# Patient Record
Sex: MALE | Race: WHITE | NOT HISPANIC OR LATINO | URBAN - METROPOLITAN AREA
[De-identification: names, ages, dates, MRNs, and addresses within clinical notes are randomized per-mention and may not be internally consistent; named-entity substitution may affect disease eponyms.]

---

## 2024-04-07 ENCOUNTER — TRANSFERRED RECORDS (OUTPATIENT)
Dept: HEALTH INFORMATION MANAGEMENT | Facility: CLINIC | Age: 24
End: 2024-04-07

## 2024-04-08 ENCOUNTER — TRANSFERRED RECORDS (OUTPATIENT)
Dept: HEALTH INFORMATION MANAGEMENT | Facility: CLINIC | Age: 24
End: 2024-04-08

## 2024-04-08 ENCOUNTER — TELEPHONE (OUTPATIENT)
Dept: BEHAVIORAL HEALTH | Facility: HOSPITAL | Age: 24
End: 2024-04-08

## 2024-04-08 ENCOUNTER — HOSPITAL ENCOUNTER (INPATIENT)
Facility: HOSPITAL | Age: 24
LOS: 3 days | Discharge: HOME | DRG: 885 | End: 2024-04-11
Attending: STUDENT IN AN ORGANIZED HEALTH CARE EDUCATION/TRAINING PROGRAM | Admitting: STUDENT IN AN ORGANIZED HEALTH CARE EDUCATION/TRAINING PROGRAM
Payer: COMMERCIAL

## 2024-04-08 ENCOUNTER — TELEPHONE (OUTPATIENT)
Dept: BEHAVIORAL HEALTH | Facility: CLINIC | Age: 24
End: 2024-04-08

## 2024-04-08 DIAGNOSIS — F33.2 SEVERE RECURRENT MAJOR DEPRESSION WITHOUT PSYCHOTIC FEATURES (H): Primary | ICD-10-CM

## 2024-04-08 PROBLEM — T14.91XA SUICIDE ATTEMPT (H): Status: ACTIVE | Noted: 2024-04-08

## 2024-04-08 PROCEDURE — 124N000001 HC R&B MH

## 2024-04-08 RX ORDER — LEVOTHYROXINE SODIUM 75 UG/1
1 TABLET ORAL DAILY
Status: ON HOLD | COMMUNITY
Start: 2023-06-21 | End: 2024-04-10

## 2024-04-08 RX ORDER — FAMOTIDINE 20 MG/1
20 TABLET, FILM COATED ORAL AT BEDTIME
COMMUNITY

## 2024-04-08 RX ORDER — ACETAMINOPHEN 325 MG/1
650 TABLET ORAL EVERY 4 HOURS PRN
Status: DISCONTINUED | OUTPATIENT
Start: 2024-04-08 | End: 2024-04-11 | Stop reason: HOSPADM

## 2024-04-08 RX ORDER — ARIPIPRAZOLE 10 MG/1
10 TABLET ORAL DAILY
Status: ON HOLD | COMMUNITY
Start: 2024-04-03 | End: 2024-04-10

## 2024-04-08 RX ORDER — MAGNESIUM HYDROXIDE/ALUMINUM HYDROXICE/SIMETHICONE 120; 1200; 1200 MG/30ML; MG/30ML; MG/30ML
30 SUSPENSION ORAL EVERY 4 HOURS PRN
Status: DISCONTINUED | OUTPATIENT
Start: 2024-04-08 | End: 2024-04-11 | Stop reason: HOSPADM

## 2024-04-08 RX ORDER — METFORMIN HCL 500 MG
1000 TABLET, EXTENDED RELEASE 24 HR ORAL
Status: ON HOLD | COMMUNITY
Start: 2024-01-15 | End: 2024-04-10

## 2024-04-08 RX ORDER — OLANZAPINE 10 MG/1
10 TABLET ORAL 3 TIMES DAILY PRN
Status: DISCONTINUED | OUTPATIENT
Start: 2024-04-08 | End: 2024-04-11 | Stop reason: HOSPADM

## 2024-04-08 RX ORDER — OLANZAPINE 10 MG/2ML
10 INJECTION, POWDER, FOR SOLUTION INTRAMUSCULAR 3 TIMES DAILY PRN
Status: DISCONTINUED | OUTPATIENT
Start: 2024-04-08 | End: 2024-04-11 | Stop reason: HOSPADM

## 2024-04-08 RX ORDER — ESCITALOPRAM OXALATE 20 MG/1
20 TABLET ORAL DAILY
Status: ON HOLD | COMMUNITY
Start: 2024-03-20 | End: 2024-04-10

## 2024-04-08 RX ORDER — LANOLIN ALCOHOL/MO/W.PET/CERES
3 CREAM (GRAM) TOPICAL
Status: DISCONTINUED | OUTPATIENT
Start: 2024-04-08 | End: 2024-04-11 | Stop reason: HOSPADM

## 2024-04-08 RX ORDER — BUPROPION HYDROCHLORIDE 150 MG/1
150 TABLET ORAL EVERY MORNING
Status: ON HOLD | COMMUNITY
Start: 2024-03-05 | End: 2024-04-10

## 2024-04-08 RX ORDER — HYDROXYZINE HYDROCHLORIDE 25 MG/1
25 TABLET, FILM COATED ORAL EVERY 4 HOURS PRN
Status: DISCONTINUED | OUTPATIENT
Start: 2024-04-08 | End: 2024-04-11 | Stop reason: HOSPADM

## 2024-04-08 ASSESSMENT — ACTIVITIES OF DAILY LIVING (ADL)
LAUNDRY: UNABLE TO COMPLETE
ORAL_HYGIENE: INDEPENDENT
DRESS: INDEPENDENT;SCRUBS (BEHAVIORAL HEALTH)
ADLS_ACUITY_SCORE: 30
ADLS_ACUITY_SCORE: 45
ADLS_ACUITY_SCORE: 30
HYGIENE/GROOMING: INDEPENDENT

## 2024-04-08 NOTE — TELEPHONE ENCOUNTER
S:  Daniele BOSCH from  [facility] called to say they were directly admitting this pt to 5 N/S.    Mario completed 2:49 PM

## 2024-04-08 NOTE — PROGRESS NOTES
04/08/24 1851   Patient Belongings   Did you bring any home meds/supplements to the hospital?  No   Patient Belongings remains with patient   Patient Belongings Remaining with Patient vision aids  (glasses)   Patient Belongings Put in Hospital Secure Location (Security or Locker, etc.) shoes;clothing;other (see comments)  (wrangler pants, underwear, belt, tennis shoes, tshirt, leather jacket)   Belongings Search Yes   Clothing Search Yes   Second Staff Barbara   Comment .     List items sent to safe: black samsung phone in black case  All other belongings put in assigned cubby in belongings room.       I have reviewed my belongings list on admission and verify that it is correct.     Patient signature_______________________________    Second staff witness (if patient unable to sign) ______________________________       I have received all my belongings at discharge.    Patient signature________________________________    Gregoria  4/8/2024  6:52 PM
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
## 2024-04-09 PROCEDURE — 99222 1ST HOSP IP/OBS MODERATE 55: CPT | Performed by: NURSE PRACTITIONER

## 2024-04-09 PROCEDURE — 250N000013 HC RX MED GY IP 250 OP 250 PS 637: Performed by: PSYCHIATRY & NEUROLOGY

## 2024-04-09 PROCEDURE — 99223 1ST HOSP IP/OBS HIGH 75: CPT | Mod: AI | Performed by: PSYCHIATRY & NEUROLOGY

## 2024-04-09 PROCEDURE — 124N000001 HC R&B MH

## 2024-04-09 RX ORDER — ESCITALOPRAM OXALATE 10 MG/1
20 TABLET ORAL DAILY
Status: DISCONTINUED | OUTPATIENT
Start: 2024-04-09 | End: 2024-04-11 | Stop reason: HOSPADM

## 2024-04-09 RX ORDER — METFORMIN HCL 500 MG
1000 TABLET, EXTENDED RELEASE 24 HR ORAL DAILY
Status: DISCONTINUED | OUTPATIENT
Start: 2024-04-09 | End: 2024-04-11 | Stop reason: HOSPADM

## 2024-04-09 RX ORDER — ARIPIPRAZOLE 10 MG/1
10 TABLET ORAL DAILY
Status: DISCONTINUED | OUTPATIENT
Start: 2024-04-09 | End: 2024-04-11 | Stop reason: HOSPADM

## 2024-04-09 RX ORDER — METHYLPREDNISOLONE 4 MG
TABLET, DOSE PACK ORAL
Status: ON HOLD | COMMUNITY
Start: 2024-04-02 | End: 2024-04-09

## 2024-04-09 RX ORDER — BUPROPION HYDROCHLORIDE 150 MG/1
150 TABLET ORAL EVERY MORNING
Status: DISCONTINUED | OUTPATIENT
Start: 2024-04-09 | End: 2024-04-11 | Stop reason: HOSPADM

## 2024-04-09 RX ORDER — FAMOTIDINE 20 MG/1
20 TABLET, FILM COATED ORAL AT BEDTIME
Status: DISCONTINUED | OUTPATIENT
Start: 2024-04-09 | End: 2024-04-11 | Stop reason: HOSPADM

## 2024-04-09 RX ADMIN — LEVOTHYROXINE SODIUM 75 MCG: 0.03 TABLET ORAL at 16:11

## 2024-04-09 RX ADMIN — FAMOTIDINE 20 MG: 20 TABLET, FILM COATED ORAL at 20:14

## 2024-04-09 RX ADMIN — BUPROPION HYDROCHLORIDE 150 MG: 150 TABLET, FILM COATED, EXTENDED RELEASE ORAL at 16:11

## 2024-04-09 RX ADMIN — ARIPIPRAZOLE 10 MG: 10 TABLET ORAL at 16:11

## 2024-04-09 RX ADMIN — ESCITALOPRAM OXALATE 20 MG: 10 TABLET ORAL at 16:11

## 2024-04-09 RX ADMIN — METFORMIN HYDROCHLORIDE 1000 MG: 500 TABLET, EXTENDED RELEASE ORAL at 16:11

## 2024-04-09 ASSESSMENT — ACTIVITIES OF DAILY LIVING (ADL)
ADLS_ACUITY_SCORE: 30
DRESS: SCRUBS (BEHAVIORAL HEALTH)
ADLS_ACUITY_SCORE: 30
ORAL_HYGIENE: INDEPENDENT
ADLS_ACUITY_SCORE: 30
DRESS: SCRUBS (BEHAVIORAL HEALTH);INDEPENDENT
ADLS_ACUITY_SCORE: 30
ADLS_ACUITY_SCORE: 30
HYGIENE/GROOMING: INDEPENDENT
ADLS_ACUITY_SCORE: 30
ORAL_HYGIENE: INDEPENDENT
ADLS_ACUITY_SCORE: 30
HYGIENE/GROOMING: INDEPENDENT
ADLS_ACUITY_SCORE: 30
ADLS_ACUITY_SCORE: 30
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
## 2024-04-09 NOTE — H&P
[facility]    History and Physical  Medical Services       Date of Admission:  4/8/2024  Date of Service (when I saw the patient): 04/09/24    Assessment & Plan     Principal Problem:    Suicide attempt (H)    Active Medical Problems:  Hashimoto's- Home dose of synthroid ordered. Reports he was diagnosed a year ago when he went to his dentist and his thyroid was enlarged. TPO antibodies was 510.2. In March of this year his TPO antibodies was 675.1. He reports he had a pcp appointment today and they were going to refer him to endocrinology. He reports heat/cold intolerances. Thyroid is enlarged on palpation. No difficulty swallowing or pain noted. He will need his pcp appointment rescheduled.     Labs reviewed- covid negative, UA showed no infection, CBC, CMP unremarkable, Magnesium wnl, alcohol negative, salicylate and acetaminophen negative, troponin negative.     Pt medically stable, no acute medical concerns. Chronic medical problems stable. Will sign off. Please consult for any new medical issues or concerns.      Code Status: Full Code    Radha Belcher CNP    Primary Care Physician   Physician No Ref-Primary    Chief Complaint   Psych evaluation     History is obtained from the patient and electronic health record    History of Present Illness   (Per ED) Reese Liang is a(n) 24 year old male with PMH including SARAH, depression, borderline personality disorder who presented on 4/7/2024 with intentional overdose of his antipsychotics Abilify and Latuda around 10:45 PM.  He also did some cutting to the left wrist superficial lacerations.  He endorses nausea and had 1 episode of emesis after ingestion.  Poison control was contacted.  It was expected that the drugs might remain in his system for 24 hours although the peak for both of them would be between 1 to 5 hours.  Monitor overnight.  EKG unremarkable.  At this point, he is awaiting PCS evaluation.  Plan for medications will be not to order his  home medications for 24 hours. Recommended to start ordering home medications on 4/8/2024.     Past Medical History    I have reviewed this patient's medical history and updated it with pertinent information if needed.   No past medical history on file.    Past Surgical History   I have reviewed this patient's surgical history and updated it with pertinent information if needed.  No past surgical history on file.    Prior to Admission Medications   Prior to Admission Medications   Prescriptions Last Dose Informant Patient Reported? Taking?   ARIPiprazole (ABILIFY) 10 MG tablet 4/8/2024 at 0829 ER  Yes Yes   Sig: Take 10 mg by mouth daily   buPROPion (WELLBUTRIN XL) 150 MG 24 hr tablet 4/8/2024 at 0829 ER  Yes Yes   Sig: Take 150 mg by mouth every morning   escitalopram (LEXAPRO) 20 MG tablet 4/8/2024 at 0829 ER  Yes Yes   Sig: Take 20 mg by mouth daily   famotidine (PEPCID) 20 MG tablet   Yes No   Sig: Take 20 mg by mouth at bedtime   levothyroxine (SYNTHROID/LEVOTHROID) 75 MCG tablet 4/8/2024 at 0859 ER  Yes Yes   Sig: Take 1 tablet by mouth daily   metFORMIN (GLUCOPHAGE XR) 500 MG 24 hr tablet 4/8/2024 at 0829 ER  Yes Yes   Sig: Take 1,000 mg by mouth daily (with breakfast)   methylPREDNISolone (MEDROL DOSEPAK) 4 MG tablet therapy pack   Yes Yes   Sig: . Follow package directions.      Facility-Administered Medications: None     Allergies   Allergies   Allergen Reactions    Dust Mites      Eye irritation    Other Environmental Allergy      sneezing    Penicillin G Hives    Lamictal [Lamotrigine] Rash       Social History   I have reviewed this patient's social history and updated it with pertinent information if needed. Reese Liang      Family History   I have reviewed this patient's family history and updated it with pertinent information if needed.   No family history on file.    Review of Systems   CONSTITUTIONAL:  negative  EYES:  negative  HEENT:  negative  RESPIRATORY:  negative  CARDIOVASCULAR:   negative  GASTROINTESTINAL:  negative  GENITOURINARY:  negative  INTEGUMENT/BREAST:  negative  HEMATOLOGIC/LYMPHATIC:  negative  ALLERGIC/IMMUNOLOGIC:  negative  ENDOCRINE:  negative  MUSCULOSKELETAL:  negative  NEUROLOGICAL:  negative    Physical Exam   Temp: 97.1  F (36.2  C) Temp src: Temporal BP: 119/82 Pulse: 108   Resp: 16 SpO2: 97 % O2 Device: None (Room air)    Vital Signs with Ranges  Temp:  [97.1  F (36.2  C)-97.7  F (36.5  C)] 97.1  F (36.2  C)  Pulse:  [106-108] 108  Resp:  [16] 16  BP: (119-122)/(77-82) 119/82  SpO2:  [97 %-98 %] 97 %  254 lbs 8 oz    Constitutional: awake, alert, cooperative, no apparent distress, and appears stated age, vitals stable   Eyes: Lids and lashes normal, pupils equal, round and reactive to light, extra ocular muscles intact, sclera clear, conjunctiva normal  ENT: Normocephalic, without obvious abnormality, atraumatic, external ears without lesions, oral pharynx with moist mucous membranes, no erythema or exudates, thyroid enlarged  Hematologic / Lymphatic: no cervical lymphadenopathy  Respiratory: No increased work of breathing, good air exchange, clear to auscultation bilaterally, no crackles or wheezing  Cardiovascular: tachycardiac, normal S1 and S2, no S3 or S4, and no murmur noted  GI: normal bowel sounds, soft, non-distended, non-tender, no masses palpated, no hepatosplenomegally  Genitounirinary: deferred  Skin: superficial cuts on left arm, scabbed, no s/s of infection, abrasion to right elbow, no s/s of infection, otherwise normal skin color, texture, turgor and no redness, warmth, or swelling  Musculoskeletal: There is no redness, warmth, or swelling of the joints.  Full range of motion noted.    Neurologic: Awake, alert, oriented to name, place and time.  Cranial nerves II-XII are grossly intact.   Neuropsychiatric: General: depressed, calm, and normal eye contact    Data   Data reviewed today:   No lab results found in last 7 days.    No results found for this  or any previous visit (from the past 24 hour(s)).

## 2024-04-09 NOTE — MEDICATION SCRIBE - ADMISSION MEDICATION HISTORY
Medication Scribe Admission Medication History    Admission medication history is complete. The information provided in this note is only as accurate as the sources available at the time of the update.    Information Source(s): Patient and CareEverywhere/SureScripts via in-person    Pertinent Information:   Patient manages his own medications and is a good historian.  Note: prior to medications administered in ER- pt has been off of his medications for the past 2 weeks.     Changes made to PTA medication list:  Added: None  Deleted:   Medrol lobo- prescribed 4/2/24, started 4/3/24, pt reports he abruptly stopped taking Saturday and had 3 remaining doses.   Changed: None    Allergies reviewed with patient and updates made in EHR: yes    Medication History Completed By: Barbara Paulino 4/9/2024 2:30 PM    PTA Med List   Medication Sig Last Dose    ARIPiprazole (ABILIFY) 10 MG tablet Take 10 mg by mouth daily 4/8/2024 at 0829 ER    buPROPion (WELLBUTRIN XL) 150 MG 24 hr tablet Take 150 mg by mouth every morning 4/8/2024 at 0829 ER    escitalopram (LEXAPRO) 20 MG tablet Take 20 mg by mouth daily 4/8/2024 at 0829 ER    levothyroxine (SYNTHROID/LEVOTHROID) 75 MCG tablet Take 1 tablet by mouth daily 4/8/2024 at 0859 ER    metFORMIN (GLUCOPHAGE XR) 500 MG 24 hr tablet Take 1,000 mg by mouth daily (with breakfast) 4/8/2024 at 0829 ER

## 2024-04-09 NOTE — PLAN OF CARE
Problem: Adult Behavioral Health Plan of Care  Goal: Patient-Specific Goal (Individualization)  Description: Patient will sleep 6 to 8 hours per night  Patient will eat at least 50% of meals  Patient will attend at least 50% of groups  Patient will comply with recommendations of treatment team  Patient will remain medication compliant     Outcome: Progressing   Goal Outcome Evaluation:       Face to face shift report received from RN. Rounding completed, pt observed. Client rested in room for 7 hours with eyes closed and respirations noted.Face to face report will be communicated to oncoming RN.    Christian Loo RN  4/9/2024  6:06 AM
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
## 2024-04-09 NOTE — DISCHARGE INSTRUCTIONS
Behavioral Discharge Planning and Instructions    Summary: Reese Liang is a 24 year old male with a past psychiatric history notable for major depressive disorder, borderline personality disorder, generalized anxiety disorder. Multiple prior inpatient psychiatric hospitalizations. No prior hx of CD. Hx of IOP.  Presents to hospital s/p overdose on psychotropic medications (lurasidone and aripiprazole) in the setting of increased psychosocial stressors.     Main Diagnosis:   #Major Depressive Disorder, Recurrent, Severe  #Generalized Anxiety Disorder  #Borderline Personality Disorder  #Overdose     Health Care Follow-up:     Primary Care:  Della English  [facility]  [address] [place] [address]  Phone: [phone]   Fax: [phone]   Appointment on Friday, April 19 @9:30am    Psychiatry:  Dr. Ton Moreno  [facility] [address])  [address] [place] [id number] [phone]  Appointment on Monday, April 22 @8:40am - Telehealth    Therapy:   [facility]  Sending intake paperwork to Reese's email    Attend all scheduled appointments with your outpatient providers. Call at least 24 hours in advance if you need to reschedule an appointment to ensure continued access to your outpatient providers.     Major Treatments, Procedures and Findings:  You were provided with: a psychiatric assessment, assessed for medical stability, medication evaluation and/or management, group therapy, family therapy, individual therapy, CD evaluation/assessment, milieu management, and medical interventions    Symptoms to Report: feeling more aggressive, increased confusion, losing more sleep, mood getting worse, or thoughts of suicide    Early warning signs can include: increased depression or anxiety sleep disturbances increased thoughts or behaviors of suicide or self-harm  increased unusual thinking, such as paranoia or hearing voices    Safety and  "Wellness:  Take all medicines as directed.  Make no changes unless your doctor suggests them.      Follow treatment recommendations.  Refrain from alcohol and non-prescribed drugs.  Ask your support system to help you reduce your access to items that could harm yourself or others. Items could include:  Firearms  Medicines (both prescribed and over-the-counter)  Knives and other sharp objects  Ropes and like materials  Car keys  If there is a concern for safety, call 911. If there is a concern for safety, call 911.    Resources:   Crisis Intervention: 375.268.4318 or 888-158-0758 (TTY: 235.265.3144).  Call anytime for help.  National [place] on Mental Illness (www.mn.magdaleno.org): 859.730.3203 or 099-481-4746.  [place] Association for Children's Mental Health (www.macmh.org): 524.893.4447.  Alcoholics Anonymous (www.alcoholics-anonymous.org): Check your phone book for your local chapter.  Suicide Awareness Voices of Education (SAVE) (www.save.org): 149-599-LSML (2793)  National Suicide Prevention Line (www.mentalhealthmn.org): 032-628-DSNB (6447)  Mental Health Consumer/Survivor Network of [place] (www.mhcsn.net): 185.349.6764 or 896-773-2828  Mental Health Association of [place] (www.mentalhealth.org): 917.454.5891 or 613-672-9565  Self- Management and Recovery Training., SMART-- Toll free: 633.500.5106  www.[employer]  Text 4 Life: txt \"LIFE\" to 87709 for immediate support and crisis intervention  Crisis text line: Text \"[place]\" to 683838. Free, confidential, 24/7.  Crisis Intervention: 777.263.5415 or 937-761-3262. Call anytime for help.     General Medication Instructions:   See your medication sheet(s) for instructions.   Take all medicines as directed.  Make no changes unless your doctor suggests them.   Go to all your doctor visits.  Be sure to have all your required lab tests. This way, your medicines can be refilled on time.  Do not use any drugs not prescribed by your doctor.  Avoid alcohol.    Advance Directives: "   Scanned document on file with [employer]? No scanned doc  Is document scanned? Pt states no documents  Honoring Choices Your Rights Handout: Informed and given  Was more information offered? Pt declined    The Treatment team has appreciated the opportunity to work with you. If you have any questions or concerns about your recent admission, you can contact the unit which can receive your call 24 hours a day, 7 days a week. They will be able to get in touch with a Provider if needed. The unit number is [phone].

## 2024-04-09 NOTE — PROGRESS NOTES
Rescheduled primary care appointment:     Della English  [facility]  [address] [place] [address]  Phone: [phone]   Fax: [phone]   Appointment on Friday, April 19 @9:30am        Confirmed psychiatry appointment:     Dr. Ton Moreno  [facility] [address] [place] ([address])  [address] [place] [address] [id number] [phone]  Appointment on Monday, April 22 @8:40am - Telehealth      Left voicemail for [facility] to schedule intake appointment for therapy at patient's request.
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
## 2024-04-10 PROCEDURE — 99232 SBSQ HOSP IP/OBS MODERATE 35: CPT | Mod: 95 | Performed by: PSYCHIATRY & NEUROLOGY

## 2024-04-10 PROCEDURE — 124N000001 HC R&B MH

## 2024-04-10 PROCEDURE — 250N000013 HC RX MED GY IP 250 OP 250 PS 637: Performed by: PSYCHIATRY & NEUROLOGY

## 2024-04-10 RX ORDER — BUPROPION HYDROCHLORIDE 150 MG/1
150 TABLET ORAL EVERY MORNING
Qty: 30 TABLET | Refills: 0 | Status: SHIPPED | OUTPATIENT
Start: 2024-04-10 | End: 2024-05-10

## 2024-04-10 RX ORDER — ARIPIPRAZOLE 10 MG/1
10 TABLET ORAL DAILY
Qty: 30 TABLET | Refills: 0 | Status: SHIPPED | OUTPATIENT
Start: 2024-04-10 | End: 2024-05-10

## 2024-04-10 RX ORDER — ESCITALOPRAM OXALATE 20 MG/1
20 TABLET ORAL DAILY
Qty: 30 TABLET | Refills: 0 | Status: SHIPPED | OUTPATIENT
Start: 2024-04-10 | End: 2024-05-10

## 2024-04-10 RX ORDER — METFORMIN HCL 500 MG
1000 TABLET, EXTENDED RELEASE 24 HR ORAL
Qty: 60 TABLET | Refills: 0 | Status: SHIPPED | OUTPATIENT
Start: 2024-04-10 | End: 2024-05-10

## 2024-04-10 RX ORDER — LEVOTHYROXINE SODIUM 75 UG/1
75 TABLET ORAL DAILY
Qty: 30 TABLET | Refills: 0 | Status: SHIPPED | OUTPATIENT
Start: 2024-04-10 | End: 2024-05-10

## 2024-04-10 RX ADMIN — METFORMIN HYDROCHLORIDE 1000 MG: 500 TABLET, EXTENDED RELEASE ORAL at 08:21

## 2024-04-10 RX ADMIN — LEVOTHYROXINE SODIUM 75 MCG: 0.03 TABLET ORAL at 08:21

## 2024-04-10 RX ADMIN — ESCITALOPRAM OXALATE 20 MG: 10 TABLET ORAL at 08:21

## 2024-04-10 RX ADMIN — ARIPIPRAZOLE 10 MG: 10 TABLET ORAL at 08:21

## 2024-04-10 RX ADMIN — FAMOTIDINE 20 MG: 20 TABLET, FILM COATED ORAL at 21:08

## 2024-04-10 RX ADMIN — BUPROPION HYDROCHLORIDE 150 MG: 150 TABLET, FILM COATED, EXTENDED RELEASE ORAL at 08:21

## 2024-04-10 ASSESSMENT — ACTIVITIES OF DAILY LIVING (ADL)
ADLS_ACUITY_SCORE: 30
HYGIENE/GROOMING: INDEPENDENT
ADLS_ACUITY_SCORE: 30
HYGIENE/GROOMING: INDEPENDENT
ADLS_ACUITY_SCORE: 30
ORAL_HYGIENE: INDEPENDENT
ADLS_ACUITY_SCORE: 30
ADLS_ACUITY_SCORE: 30
LAUNDRY: UNABLE TO COMPLETE
ADLS_ACUITY_SCORE: 30
ADLS_ACUITY_SCORE: 30
DRESS: SCRUBS (BEHAVIORAL HEALTH);INDEPENDENT
ADLS_ACUITY_SCORE: 30
LAUNDRY: UNABLE TO COMPLETE
ORAL_HYGIENE: INDEPENDENT
ADLS_ACUITY_SCORE: 30
ADLS_ACUITY_SCORE: 30
DRESS: SCRUBS (BEHAVIORAL HEALTH)
ADLS_ACUITY_SCORE: 30

## 2024-04-10 NOTE — PLAN OF CARE
Problem: Adult Behavioral Health Plan of Care  Goal: Patient-Specific Goal (Individualization)  Description: Patient will sleep 6 to 8 hours per night  Patient will eat at least 50% of meals  Patient will attend at least 50% of groups  Patient will comply with recommendations of treatment team  Patient will remain medication compliant     Outcome: Progressing     Problem: Suicide Risk  Goal: Absence of Self-Harm  Description: Pt will report a decrease in depression by discharge  Pt will report any increase in suicidal thoughts  Pt will be free from any self harm  Pt will request PRN if increased anxiety   Outcome: Progressing     Face to face shift report received from Radha. Rounding completed, pt observed.     Pt pleasant and cooperative with nursing assessment. Pt is compliant with medications this shift. Pt denies SI at this time and says he regrets overdosing. Pt has not had any noted episodes of self harm this shift.    Face to face report will be communicated to oncoming RN.    Krissy Martinez RN  4/10/2024  12:38 PM

## 2024-04-10 NOTE — PLAN OF CARE
Problem: Adult Behavioral Health Plan of Care  Goal: Patient-Specific Goal (Individualization)  Description: Patient will sleep 6 to 8 hours per night  Patient will eat at least 50% of meals  Patient will attend at least 50% of groups  Patient will comply with recommendations of treatment team  Patient will remain medication compliant     Outcome: Progressing   Goal Outcome Evaluation:       Face to face shift report received from RN. Rounding completed, pt observed.Client rested in room for 7 hours with eyes closed and respirations noted.Face to face report will be communicated to oncoming RN.    Christian Loo RN  4/10/2024  6:37 AM

## 2024-04-10 NOTE — PLAN OF CARE
Problem: Adult Behavioral Health Plan of Care  Goal: Patient-Specific Goal (Individualization)  Description: Patient will sleep 6 to 8 hours per night  Patient will eat at least 50% of meals  Patient will attend at least 50% of groups  Patient will comply with recommendations of treatment team  Patient will remain medication compliant     4/9/2024 2116 by Guerline Henry RN  Outcome: Progressing    Plan of Care Reviewed With: patient      Patient is Alert, able to make needs known. VSS,  HR in the 100s, afebrile. Denies pain.   Patient pleasant, cooperative. Denies SI, SIB, HI, hallucinations, and anxiety. Patient states he feels like he is doing better as he is catching up on sleep. States he did not sleep much prior to coming in. States he feels like he is ready to go.  State acute stressors caused him to be impulsive. He is attending groups. Appetite good. Ate 100% of dinner. Steady gait, no falls. Took scheduled medications.     End of shift report given to DANITZA TAPIA.    Patient requests all Lab, Cardiology, and Radiology Results on their Discharge Instructions

## 2024-04-10 NOTE — PROGRESS NOTES
[facility] PSYCHIATRY  -  PROGRESS NOTE     ID   Name: Reese Liang  MRN#: [id number]     SUBJECTIVE   Prior to interviewing the patient, I met with nursing and reviewed patient's clinical condition. We discussed clinical care both before and after the interview. I have reviewed the patient's clinical course by review of records including previous notes, labs, and vital signs.     Per nursing, the patient had the following behavioral events over the last 24-hours: none    On psychiatric interview, he is met in his room. He states he is feeling better. He denies SI.  He states groups have been helpful. Denies any physical concerns. Able to safety plan. Wishes to dismiss tomorrow.        MEDICATIONS   Scheduled Meds:  Current Facility-Administered Medications   Medication Dose Route Frequency Provider Last Rate Last Admin    ARIPiprazole (ABILIFY) tablet 10 mg  10 mg Oral Daily Luis A Boyer MD   10 mg at 04/10/24 0821    buPROPion (WELLBUTRIN XL) 24 hr tablet 150 mg  150 mg Oral QAM Luis A Boyer MD   150 mg at 04/10/24 0821    escitalopram (LEXAPRO) tablet 20 mg  20 mg Oral Daily Luis A Boyer MD   20 mg at 04/10/24 0821    famotidine (PEPCID) tablet 20 mg  20 mg Oral At Bedtime Luis A Boyer MD   20 mg at 04/09/24 2014    levothyroxine (SYNTHROID/LEVOTHROID) tablet 75 mcg  75 mcg Oral Daily Luis A Boyer MD   75 mcg at 04/10/24 0821    metFORMIN (GLUCOPHAGE XR) 24 hr tablet 1,000 mg  1,000 mg Oral Daily Luis A Boyer MD   1,000 mg at 04/10/24 0821     PRN Meds:.  Current Facility-Administered Medications   Medication Dose Route Frequency Provider Last Rate Last Admin    acetaminophen (TYLENOL) tablet 650 mg  650 mg Oral Q4H PRN Lakeshia Goldberg NP        alum & mag hydroxide-simethicone (MAALOX) suspension 30 mL  30 mL Oral Q4H PRN Lakeshia Goldberg NP        hydrOXYzine HCl (ATARAX) tablet 25 mg  25 mg Oral Q4H PRN Lakeshia Goldberg NP        melatonin tablet 3 mg  3 mg  "Oral At Bedtime PRN Lakeshia Goldberg, NP        nicotine (NICORETTE) gum 2 mg  2 mg Buccal Q1H PRN Lakeshia Goldberg NP        OLANZapine (zyPREXA) tablet 10 mg  10 mg Oral TID PRN Lakeshia Goldberg NP        Or    OLANZapine (zyPREXA) injection 10 mg  10 mg Intramuscular TID PRN Lakeshia Goldberg NP            ALLERGIES   Allergies   Allergen Reactions    Dust Mites      Eye irritation    Other Environmental Allergy      sneezing    Penicillin G Hives    Lamictal [Lamotrigine] Rash        VITALS   Vitals: /74   Pulse 98   Temp 98.7  F (37.1  C) (Temporal)   Resp 18   Ht 1.829 m (6')   Wt 115.4 kg (254 lb 8 oz)   SpO2 97%   BMI 34.52 kg/m       MENTAL STATUS EXAM   Appearance:  awake, alert, adequately groomed, dressed in hospital scrubs, and appeared as age stated  Attitude:  cooperative  Eye Contact:  good  Mood:  \"better\"  Affect:  mood congruent  Speech:  clear, coherent  Psychomotor Behavior:  no evidence of tardive dyskinesia, dystonia, or tics  Thought Process:  logical, linear, and goal oriented  Associations:  no loose associations  Thought Content:  no evidence of suicidal ideation or homicidal ideation  Insight: improving  Judgment: improving  Oriented to:  time, person, and place  Attention Span and Concentration:  intact  Recent and Remote Memory:  intact  Gait and Station: Normal        LABS   No results found for this or any previous visit (from the past 24 hour(s)).      ASSESSMENT   Reese Liang is a 24 year old male with a past psychiatric history notable for major depressive disorder, borderline personality disorder, generalized anxiety disorder. Multiple prior inpatient psychiatric hospitalizations. No prior hx of CD. Hx of IOP.  Presents to hospital s/p overdose on psychotropic medications (lurasidone and aripiprazole) in the setting of increased psychosocial stressors. Patient was subsequently transferred and accepted for inpatient psychiatric hospitalization at [facility] for further safety and further stabilization.     Daily Progress: tolerated transition to unit without complication. Attending groups. Denies SI.            DIAGNOSTIC FORMULATION   #Major Depressive Disorder, Recurrent, Severe  #Generalized Anxiety Disorder  #Borderline Personality Disorder  #Overdose      PLAN     Location: Unit 5  Legal Status: Orders Placed This Encounter      Legal status 72 Hour Hold    Safety Assessment:    Behavioral Orders   Procedures    Code 1 - Restrict to Unit    Routine Programming     As clinically indicated    Status 15     Every 15 minutes.        PTA medications held:   -none     PTA medications continued/changed:   -abilify 10 mg q daily  -lexapro 20 mg q daily  -bupropion  mg     New medications initiated:   -none    Today's Changes:  -continue plan without alteration    Programming: Patient will be treated in a therapeutic milieu with appropriate individual and group therapies. Education will be provided on diagnoses, medications, and treatments. Encouraged behavioral activation and participation in group programming.     Medical diagnoses:  Per medicine    Disposition: pending clinical course        TREATMENT TEAM CARE PLAN     Progress: Continued symptoms.    Continued Stay Criteria/Rationale: Continued symptoms without sufficient improvement/resolution.    Medical/Physical: See above.    Precautions: See above.     Plan: Continue inpatient care with unit support and medication management.    Rationale for change in precautions or plan: NA due to no change.    Participants: Luis A Boyer MD, Nursing, SW, OT.    The patient's care was discussed with the treatment team and chart notes were reviewed.       ATTESTATION    Luis A Boyer MD  [facility]   Psychiatry    Type of Service: video visit for mental health treatment  Reason for Video Visit: COVID-19 [place]  Originating Site (patient  location): [facility]  Distant Site (provider location): Remote Location  Mode of Communication: Video Conference via [employer]  Time of Service: Date: 04/10/2024 , Start: 11:00,  Stop: 11:30

## 2024-04-10 NOTE — PROGRESS NOTES
Spoke on the phone with [facility]. Provided them with Reese's email address at his request so Neel can send him their intake paperwork for therapy.

## 2024-04-11 VITALS
TEMPERATURE: 96.8 F | RESPIRATION RATE: 14 BRPM | HEART RATE: 78 BPM | WEIGHT: 254.5 LBS | SYSTOLIC BLOOD PRESSURE: 119 MMHG | BODY MASS INDEX: 34.47 KG/M2 | HEIGHT: 72 IN | DIASTOLIC BLOOD PRESSURE: 77 MMHG | OXYGEN SATURATION: 98 %

## 2024-04-11 PROCEDURE — 250N000013 HC RX MED GY IP 250 OP 250 PS 637: Performed by: PSYCHIATRY & NEUROLOGY

## 2024-04-11 PROCEDURE — 99239 HOSP IP/OBS DSCHRG MGMT >30: CPT | Mod: 95 | Performed by: PSYCHIATRY & NEUROLOGY

## 2024-04-11 RX ADMIN — LEVOTHYROXINE SODIUM 75 MCG: 0.03 TABLET ORAL at 06:59

## 2024-04-11 RX ADMIN — ESCITALOPRAM OXALATE 20 MG: 10 TABLET ORAL at 06:59

## 2024-04-11 RX ADMIN — ARIPIPRAZOLE 10 MG: 10 TABLET ORAL at 07:00

## 2024-04-11 RX ADMIN — BUPROPION HYDROCHLORIDE 150 MG: 150 TABLET, FILM COATED, EXTENDED RELEASE ORAL at 07:00

## 2024-04-11 RX ADMIN — METFORMIN HYDROCHLORIDE 1000 MG: 500 TABLET, EXTENDED RELEASE ORAL at 07:00

## 2024-04-11 ASSESSMENT — ACTIVITIES OF DAILY LIVING (ADL)
ADLS_ACUITY_SCORE: 30

## 2024-04-11 NOTE — PLAN OF CARE
Problem: Adult Behavioral Health Plan of Care  Goal: Patient-Specific Goal (Individualization)  Description: Patient will sleep 6 to 8 hours per night  Patient will eat at least 50% of meals  Patient will attend at least 50% of groups  Patient will comply with recommendations of treatment team  Patient will remain medication compliant     Outcome: Progressing     Problem: Suicide Risk  Goal: Absence of Self-Harm  Description: Pt will report a decrease in depression by discharge  Pt will report any increase in suicidal thoughts  Pt will be free from any self harm  Pt will request PRN if increased anxiety   Outcome: Progressing     Pt denies SI/HI, AH/VH, pain, anxiety, depression. Pt is medication compliant and VSS. Pt ate 100% of dinner. Pt has a bright affect. Pt is calm and alert. Pt is using appropriate behavior with staff and peers. Pt spent most of shift in lounge talking and being social with a female peer.     Face to face report will be communicated to [facility].    Shireen Barfield RN  4/10/2024

## 2024-04-11 NOTE — PROGRESS NOTES
Pt is discharging at the recommendation of the treatment team. Pt is discharging to home transported by [employer]. Pt denies having any thoughts of hurting themself or anyone else. Pt denies anxiety or depression. Pt has follow up with pcp and med manager. Discharge instructions, including; demographic sheet, psychiatric evaluation, discharge summary, and AVS were faxed to these next level of care providers.

## 2024-04-11 NOTE — PLAN OF CARE
Discharge Note    Patient Discharged to home on 4/11/2024 7:40 AM [employer] accompanied by self.     Patient informed of discharge instructions in AVS. patient verbalizes understanding and denies having any questions pertaining to AVS. Patient stable at time of discharge. Patient denies SI, HI, and thoughts of self harm at time of discharge. All personal belongings returned to patient. Discharge prescriptions sent to [facility] via electronic communication. Psych evaluation, history and physical, AVS, and discharge summary faxed to next level of care- Per TORRIE.     Miladys Fontana, RN  4/11/2024  7:40 AM

## 2024-04-11 NOTE — PLAN OF CARE
Face to face end of shift report will be communicated to oncoming RN.    Problem: Adult Behavioral Health Plan of Care  Goal: Patient-Specific Goal (Individualization)  Description: Patient will sleep 6 to 8 hours per night  Patient will eat at least 50% of meals  Patient will attend at least 50% of groups  Patient will comply with recommendations of treatment team  Patient will remain medication compliant     Outcome: Progressing     Problem: Suicide Risk  Goal: Absence of Self-Harm  Description: Pt will report a decrease in depression by discharge  Pt will report any increase in suicidal thoughts  Pt will be free from any self harm  Pt will request PRN if increased anxiety   Outcome: Progressing   Face to face end of shift report obtained from DANITZA Iyer. Pt observed resting in bed.   Pt appears to be sleeping in bed with eyes closed. 15 minutes and PRN safety checks completed with no noted complains. No self harm noted or reported so far this shift.   0600-Pt appeared to had slept 6.5 hours.

## 2024-04-12 ENCOUNTER — TELEPHONE (OUTPATIENT)
Dept: BEHAVIORAL HEALTH | Facility: HOSPITAL | Age: 24
End: 2024-04-12

## 2024-04-12 NOTE — TELEPHONE ENCOUNTER
"[facility]: Post-Hospital Discharge Note     Situation   Post hospital discharge call placed 04/12/24.    This writer spoke with Reese.    Inpatient Mental Health Admission Information:  Admission Date: 4/8/24  Admission Reason: Suicidal ideation or attempt / self harm and Depressive symptom. Reese Liang is a 24 year old male with a past psychiatric history notable for major depressive disorder, borderline personality disorder, generalized anxiety disorder. Multiple prior inpatient psychiatric hospitalizations. No prior hx of CD. Hx of IOP.  Presents to hospital s/p overdose on psychotropic medications (lurasidone and aripiprazole) in the setting of increased psychosocial stressors. Patient was subsequently transferred and accepted for inpatient psychiatric hospitalization at [facility] for further safety and further stabilization.     Inpatient Mental Health Discharge Information:  Discharge Date: 4/11/24  Discharged to: Home/ self care  Discharge Diagnosis: Major Depressive Disorder, Recurrent, Severe  #Generalized Anxiety Disorder  #Borderline Personality Disorder  #Overdose     Background    The following information is obtained from the hospital after visit summary and inpatient provider notes.     \"Legal Status: Orders Placed This Encounter      Legal status 72 Hour Hold     Safety Assessment:         Behavioral Orders   Procedures    Code 1 - Restrict to Unit    Routine Programming       As clinically indicated    Status 15       Every 15 minutes.      Imminent risk of harm in a setting less restrictive than an inpatient psychiatric facility is determined to be medium to high.         PTA medications held:   -none     PTA medications continued/changed:   -abilify 10 mg q daily  -lexapro 20 mg q daily  -bupropion  mg     New medications initiated:   -none     Programming: Patient will be treated in a therapeutic milieu with appropriate individual and group " "therapies. Education will be provided on diagnoses, medications, and treatments.      Medical diagnoses:  Per medicine     Diagnostic studies and consultations:  No additional studies or tests recommended on admission.     Level of care required: Acute psychiatric hospitalization     Justification for hospitalization and estimated length of stay: reasons for hospitalization include potential safety risk to self or others within the last week, decreased functioning in outpatient setting and in the setting of no outpatient management, need for highly structured inpatient management for stabilization of psychiatric symptoms, need for psychiatric medication initiation and stabilization\"    (DELETE THE FOLLOWING: Insert data from provider discharge summary-hospital course. Must include medications (stopped, added, changed) and narrative relating to how patient was doing at time of discharge -improvement, new symptoms, worsening symptoms)    Post Discharge Assessment   How have your symptoms been since being discharged from the hospital? \"I'm doing really good\"  Do you have your discharge instructions/after visit summary? YES  Do you have any questions related to your discharge instructions? NO    Discharge Medication Assessment   Medications were reviewed in full on discharge, including: Medications to be started, medications to be stopped, medications to be continued from preadmission and any side effects.      Prescriptions were e-scribed or sent to their preferred pharmacy at discharge and were able to be filled: Yes  Do you have any questions about your medications? NO    Outpatient Plan/Future Appointments  Discharge follow up appointment scheduled within 14 days of discharging from hospital? Yes   Health Care Follow-up:      Primary Care:  Della English  [facility]  [address] [place] [address]  Phone: [phone]   Fax: [phone]   Appointment on Friday, April 19 @9:30am   "   Psychiatry:  Dr. Ton Moreno  [address] ([address])  [address] [place] [address]  [phone]  Appointment on Monday, April 22 @8:40am - Telehealth     Therapy:   [facility]  Sending intake paperwork to Reese's email      Further information, barriers, or follow up this writer has addressed: N/A

## 2024-04-13 NOTE — DISCHARGE SUMMARY
[facility] PSYCHIATRY  DISCHARGE SUMMARY     DISCHARGE DATA     Reese Liang MRN# [id number]   Age: 24 year old YOB: 2000     Date of Admission: 4/8/2024  Date of Discharge: April 11, 2024  Discharge Provider: Luis A Boyer MD       REASON FOR ADMISSION   Reese Liang is a 24 year old male with a past psychiatric history notable for major depressive disorder, borderline personality disorder, generalized anxiety disorder. Multiple prior inpatient psychiatric hospitalizations. No prior hx of CD. Hx of IOP.  Presents to hospital s/p overdose on psychotropic medications (lurasidone and aripiprazole) in the setting of increased psychosocial stressors. Patient was subsequently transferred and accepted for inpatient psychiatric hospitalization at [facility] for further safety and further stabilization.           DISCHARGE DIAGNOSES   #Major Depressive Disorder, Recurrent, Severe  #Generalized Anxiety Disorder  #Borderline Personality Disorder  #Overdose        HOSPITAL COURSE   Patient was admitted to unit 5 due to the aforementioned presentation. The patient was placed under 15 minute checks to ensure patient safety. The patient participated in unit programming and groups as able.    Legal status during hospitalization was involuntary .Mr. Liang did not require seclusion/restraint during hospitalization.     We reviewed with Mr. Liang current and past medication trials including duration, dose, response and side effects. During this hospitalization, the following changes to the patient's psychotropic medications were made:    PTA medications held:   -none     PTA medications continued/changed:   -abilify 10 mg q daily  -lexapro 20 mg q daily  -bupropion  mg     New medications initiated:   -none         Mr. Liang progressed gradually related to response to medication changes, confidence in skills to manage symptoms, and establishment of a  supportive community network . By the time of discharge, his symptoms had improved adequately.  Depression improved with increased confidence in ability to manage symptoms., Suicidal ideation resolved with adequate outpatient plan in place. , and Psychotropic medications utilized were found to be helpful without significant adverse side effects. The treatment goals (long-term goal/discharge criteria) were reached.     With the aforementioned changes and supports the patient noticed improvement in their symptoms and felt sufficiently ready for discharge. As a result, Reese Liang was discharged. At the time of discharge, Reese Liang was determined to not be a danger to self or others. The patient was also medically stable for discharge. At the current time of discharge, the patient does not meet criteria for involuntary hospitalization. On the day of discharge, the patient reports that they do not have suicidal or homicidal ideation. Steps taken to minimize risk include: assessing patient s behavior and thought process daily during hospital stay, discharging patient with adequate plan for follow up for mental and physical health and discussing safety plan of returning to the hospital should the patient ever have thoughts of harming themselves or others. Therefore, based on all available evidence including the factors cited above, the patient does not appear to be at imminent risk for self-harm, and is appropriate for outpatient level of care. The acute crisis is resolved and Reese is discharging in improved condition. Though Reese's acute crisis has resolved, there remains a higher risk of suicide over the long term compared to the general population. Factors that may be associated with relapse include worsening of depressive symptoms, alcohol use, illicit substance use, not taking medications, lack of mental health follow-up appointments and work/family/relationship stressors. Reese Liang has a plan in  "place to mitigate these stressors, is hopeful about the future ,and is not assessed to be a imminent risk to self or anyone else and thus is not assessed to be holdable.  Reese has received maximal benefit from the current hospital stay. Reese Liang set to discharge.        DISCHARGE MEDICATIONS     Discharge Medication List as of 4/11/2024  6:36 AM        CONTINUE these medications which have CHANGED    Details   ARIPiprazole (ABILIFY) 10 MG tablet Take 1 tablet (10 mg) by mouth daily for 30 days, Disp-30 tablet, R-0, E-Prescribe      buPROPion (WELLBUTRIN XL) 150 MG 24 hr tablet Take 1 tablet (150 mg) by mouth every morning for 30 days, Disp-30 tablet, R-0, E-Prescribe      escitalopram (LEXAPRO) 20 MG tablet Take 1 tablet (20 mg) by mouth daily for 30 days, Disp-30 tablet, R-0, E-Prescribe      levothyroxine (SYNTHROID/LEVOTHROID) 75 MCG tablet Take 1 tablet (75 mcg) by mouth daily for 30 days, Disp-30 tablet, R-0, E-Prescribe      metFORMIN (GLUCOPHAGE XR) 500 MG 24 hr tablet Take 2 tablets (1,000 mg) by mouth daily (with breakfast) for 30 days, Disp-60 tablet, R-0, E-Prescribe           CONTINUE these medications which have NOT CHANGED    Details   famotidine (PEPCID) 20 MG tablet Take 20 mg by mouth at bedtime, Historical                VITALS   Vitals: /77   Pulse 78   Temp 96.8  F (36  C) (Temporal)   Resp 14   Ht 1.829 m (6')   Wt 115.4 kg (254 lb 8 oz)   SpO2 98%   BMI 34.52 kg/m       MENTAL STATUS EXAM   Appearance: Alert, oriented, dressed in hospital scrubs, appears stated age   Attitude: Cooperative   Eye Contact: Good  Mood: \"Better\"  Affect: Full range of affect  Speech: Normal rate and rhythm   Psychomotor Behavior: No tremor, rigidity, or psychomotor abnormality   Thought Process: Logical, goal directed   Associations: No loose associations   Thought Content: Denies SI or plan. No SIB. Denies A/V hallucinations. No evidence of delusional thought.  Insight: Good  Judgment: " Good  Oriented to: Person, place, and time  Attention Span and Concentration: Intact  Recent and Remote Memory: Intact  Language: English with appropriate syntax and vocabulary  Fund of Knowledge: Average  Muscle Strength and Tone: Grossly normal  Gait and Station: Grossly normal       DISCHARGE PLAN     1.  Education given regarding diagnostic and treatment options with risks, benefits and alternatives with adequate verbalization of understanding.  2.  Discharge to home. Upon detailed review of risk factors, patient amenable for release.   3.  Continue aforementioned medications and associated medication changes with follow-up by outpatient provider.  4.  Crisis management planning in place.    5.  Nursing and  to review further discharge recommendations.   6.  Active issues: No active medical issues requiring immediate follow-up care.  7.  Patient is being discharged with the following appointments as detailed below:    See AVS       DISCHARGE SERVICES PROVIDED     80 minutes spent on discharge services, including:  Final examination of patient.  Review and discussion of hospital stay.  Instructions for continued outpatient care/goals.  Preparation of discharge records.  Preparation of medications refills and new prescriptions.  Preparation of applicable referral forms.        ATTESTATION   Luis A Boyer MD  [facility]  Type of Service: video visit for mental health treatment  Reason for Video Visit: COVID-19 [place]  Originating Site (patient location): [facility]  Distant Site (provider location): Remote Location  Mode of Communication: Video Conference via [employer]  Time of Service: Date: April 11, 2024 , Start: 07:00,  Stop: 08:00     LABS THIS ADMISSION     No results found for any visits on 04/08/24.